# Patient Record
Sex: MALE | Race: WHITE
[De-identification: names, ages, dates, MRNs, and addresses within clinical notes are randomized per-mention and may not be internally consistent; named-entity substitution may affect disease eponyms.]

---

## 2018-09-17 NOTE — NUR
ASSUMED CARE OF PT THIS AM AROUND 0715- CARDIAC MONITOR IN PLACE AS ORDERED,
TRACING SR- UPON ASSESSMENT PT NOTED TO BE RESTING IN BED, WATCHING TV- PT A&O
X4- CONTINENT OF BOWEL AND BLADDER- UP AD-DWAIN IN ROOM, STEADY GAIT NOTED-
LCTA, RESP EVEN AND UN-LABORED AT REST- DYSPNEA NOTED ON EXERTION- VSS, O2 SAT
95% ON RA- ABDOMEN OBESE/ROUND/NON-TENDER, BS X 4 QUADS- PT REPORTS LAST BM
9/16/18- +1 PEDAL EDEMA NOTED- IV NOTED TO RUE INTACT AND SL- PT JANETH NPO
FOR PLANNED DUBUTAMINE STRESS ECHO TEST- D/C PENDING RESULTS- PT DENIES ANY
C/O PAIN/DISCOMFORT AT THIS TIME- CALL LIGHT AND PERSONAL BELONGINGS WITH IN
REACH- HOURLY ROUNDS IN PLACE R/T SAFETY/NEEDS- ALL NEEDS MET AT THIS
TIME-WCTM

## 2018-09-17 NOTE — EXE
[place] [address]
Phone:  [phone]                     STRESS ECHOCARDIOGRAM         
_______________________________________________________________________________
 
Name:         JAKE CASTELAN              Room:          [address]#:    [id number]     Account #:     [id number]  
Admission:    09/16/18    Attend Phys:   Uziel Blanton
Discharge:                Date of Birth: [birth year]  
Date of Service: 09/17/18 1702  Report #:      [id number]
        [id number]
_______________________________________________________________________________
THIS REPORT FOR:  //name//                      
 
 
--------------- APPROVED REPORT --------------
 
 
Study performed:  09/17/2018 15:51:36
 
Exam:  Dobutamine Stress Echo
Indication: Chest pain
Patient Location: In-Patient
Stress Nurse: Patrica Workman RN
Room #: [facility]
Supervising Physician: Todd Watters MD
 
Ht: 5 ft 10 in     
HR: 78 bpm       BP: 146/87 mmHg 
 
Medical History
Cardiac Risk Factors: HTN, Hyperlipidemia, FHX of CAD
 
Procedure
The patient underwent a Pharmacological Stress Test using Dobutamine. 
Blood pressure, heart rate, and EKG were monitored.
An Echocardiogram was performed by technician in four stages in quad 
fashion.  At peak stress, four selected images were obtained and 
placed side by side with resting images for comparison.
 
Echo Enhancing Agent
Indication: Endocardial border delineation
Agent(s) / Amount(s) Used: Optison 10 cc
 
Stress Test Details
Stress Test:  Pharmacological Stress Test using 
Dobutamine.      
HR           
Resting HR:            78 bpm   Max Heart Rate (APMHR): 165 bpm  
Max HR Achieved:  141 bpm   Target HR (85% APMHR): 140 bpm  
% of APMHR:         85         
Recovery HR:            94 bpm        
HR response to stress: Normal HR response to stress      
 
BP           
Resting BP:  146/87 mmHg        
Max BP:       156/86 mmHg        
Recovery BP:       110/70 mmHg        
 
 
[place] [address]
Phone:  [phone]                     STRESS ECHOCARDIOGRAM         
_______________________________________________________________________________
 
Name:         JAKE CASTELAN              Room:          [address]#:    [id number]     Account #:     [id number]  
Admission:    09/16/18    Attend Phys:   Uziel Blanton
Discharge:                Date of Birth: [birth year]  
Date of Service: 09/17/18 1702  Report #:      [id number]
        [id number]
_______________________________________________________________________________
 
ECG           
Resting ECG:  Sinus Rhythm, nonspecific ST-T 
abnormalities      
Stress ECG:     Sinus Rhythm, nonspecific ST-T abnormalities      
ST Change: Upsloping ST depression       
Maximum ST Deviation: 0.5 mm        
Arrhythmia:    None         
Recovery ECG: Sinus Rhythm, nonspecific ST-T 
abnormalities      
Recovery ST Change: Horizontal ST depression      
Recovery ST Deviation: 0.5 mm        
Recovery Arrhythmia: None        
 
Pre-Stress Echo
technically difficult to assess wall motion 
 
Post-Stress Echo
technically difficult to asses wall motion 
changes
 
Conclusion
Clinical Response:  Non-ischemic
Stress ECG Response:  Indeterminant
Stress Echo Images:  Indeterminant
images were suboptimal to assess for ischemic changes
 
Other Information
Study Quality: Technically Limited
Technically limited study due to body 
habitus.
 
&lt;Conclusion&gt;
images were suboptimal to assess for ischemic changes
 
 
 
 
 
 
 
 
 
 
  <ELECTRONICALLY SIGNED>
                                           By: Todd Watters MD, FACC      
  09/17/18 1702
D: 09/17/18 1702   _____________________________________
T: 09/17/18 1702   Todd Watters MD, FACC        /INF

## 2018-09-17 NOTE — NUR
RECEIVED REPORT AND ASSUMED CARE AT 2250. PT TRANSPORTED FROM ED TO ROOM 202.
VSS, CARDIAC MONITORING IN PLACE. ASSESSMENT COMPLETED AS CHARTED. PT DENIES
ANY CURRENT PAIN. PT ORIENTATED TO ROOM, FALL POLICY, CALL LIGHT. PT WEARING
HOME CPAP AT [facility]. ALL QUESTIONS AND CONCERNS ADDRESSED AT THIS TIME. BED
LOCKED IN LOWEST POSITION, CALL LIGHT WITHIN REACH. HOURLY ROUNDING COMPLETED
AND ALL NEEDS MET. WILL CONTINUE TO MONITOR

## 2018-09-17 NOTE — NUR
Pt is A&O. Resides at home. Independent with ADLs, continues to work outside
of the home. Pt has a home cpap. No hx of HH or SNF. Goal is to return home at
dc. Possible dc today, pendin echo. Following.

## 2018-09-17 NOTE — EKG
[place] [address]
Phone:  [phone]                     ELECTROCARDIOGRAM REPORT      
_______________________________________________________________________________
 
Name:       JAKE CASTELAN               Room:           [address]#:  [id number]      Account #:      [id number]  
Admission:  18     Attend Phys:    LILLIANA Chappell
Discharge:               Date of Birth:  [birth year]  
         Report #: [id number]
    [id number]
_______________________________________________________________________________
THIS REPORT FOR:  //name//                      
 
                         [facility] ED
                                       
Test Date:    2018               Test Time:    20:06:58
Pat Name:     JAKE CASTELAN           Department:   
Patient ID:   [id number]            Room:         [facility]
Gender:       M                        Technician:   DEION
:          1965               Requested By: Tito Núñez
Order Number: [id number] MD:   Todd Watters
                                 Measurements
Intervals                              Axis          
Rate:         91                       P:            -25
MS:           162                      QRS:          82
QRSD:         91                       T:            1
QT:           348                                    
QTc:          429                                    
                           Interpretive Statements
Sinus rhythm
nonspecific st changes
Low voltage, precordial leads
Compared to ECG 2011 18:26:28
Low QRS voltage now present
 
 
Electronically Signed On 2018 13:04:45 CDT by Todd Watters
[email or web address]
 
 
 
 
 
 
 
 
 
 
 
 
 
 
 
 
  <ELECTRONICALLY SIGNED>
                                           By: Todd Watters MD, FAC      
  18     1304
D: 18   _____________________________________
T: 18   Todd Watters MD, [facility]        /EPI

## 2018-09-17 NOTE — NUR
PT CURRENTLY RESTING IN BED SIDE RECLINER- CARDIAC MONITOR IN PLACE AS
ORDERED, TRACING SR- STRESS DUBUTIME ECHO COMPLETED THIS SHIFT- RESULTS NOTED
TO STATE THAT IMAGES WERE SUBOPTIMAL TO ASSESS FOR ISCHEMIC CHANGES- RESULTS
REPORTED TO - WITH ORDERS RECIEVIED FOR PT TO STAY ANOTHER NIGHT AND
CARDIOLOGY TO BE CONCULTED FOR FURTHER EVALUATION- PT HAS BEEN UPDATED ON PLAN
AND OKAY TO STAY AT THIS POINT- DENIES ANY C/O PAIN/DISCOMFORT AT THIS TIME-
ALL NEEDS MET AT THIS TIME- ABLE TO MAKE NEEDS KNOWN- ALL NEEDS MET AT THIS
TIME-WCTM

## 2018-09-18 NOTE — NUR
ASSUMED CARE OF PT AROUND 0730 THIS AM. REFER TO ASSESSMENT. PT HAS NO C/O
PAIN THIS AM. DC HOME ORDERS OBTAINED. PT GIVEN DISCHARGE INSTRUCTIONS AND
VERBALIZES UNDERSTANDING. PT TO F/U WITH PCP AND CARDIOLOGY. PT WAITING FOR
RIDE AT THIS TIME. PT WILL DC PER W/C WITH NURSING STAFF. NO OTHER CONCERNS AT
THIS TIME. CLWR. WCTM.

## 2018-09-18 NOTE — NUR
BEGAN CARE OF PT AT APPROXIMATELY 0200 TODAY. PT IS ABLE TO COMMUNICATE HIS
NEEDS TO STAFF EFFECTIVELY. HE HAS DENIED THE NEED FOR PAIN MEDICATION UP TO
THIS TIME. PT WEARING OWN CPAP AT NIGHT WHILE SLEEPING UP TO THIS TIME.
POSSIBLE DISCHARGE TODAY.

## 2018-09-18 NOTE — NUR
RECEIVED REPORT AND ASSUMED CARE AT 1900. VSS. CARDIAC MONITORING IN PLACE. PT
DENIES ANY COMPLAINTS OF PAIN. ASSESSMENT COMPLETED AS CHARTED. DISCUSSED PLAN
OF CARE WITH PT, VERBALIZED UNDERSTANDING. PT TO BE NPO AFTER MIDNIGHT FOR
CARDIOLOGY CONSULT. MEDICATION ADMIN PER EMAR. PT UP AD DWAIN IN ROOM, ON RA.
BED LOCKED IN LOWEST POSITION, CALL LIGHT WITHIN REACH. WILL CONTINUE TO
MONITOR FOR REMAINDER OF THE SHIFT

## 2019-11-04 ENCOUNTER — HOSPITAL ENCOUNTER (OUTPATIENT)
Dept: HOSPITAL 96 - M.CRD | Age: 54
End: 2019-11-04
Attending: INTERNAL MEDICINE
Payer: COMMERCIAL

## 2019-11-04 DIAGNOSIS — I10: Primary | ICD-10-CM

## 2019-11-04 DIAGNOSIS — R06.00: ICD-10-CM

## 2019-11-04 NOTE — 2DMMODE
[place] [address]
Phone:  [phone] 2 D/M-MODE ECHOCARDIOGRAM     
_______________________________________________________________________________
 
Name:         JAKE CASTELAN        Room:                     [facility]
M.R.#:    [id number]     Account #:     [id number]  
Admission:    11/04/19    Attend Phys:   Edmund Barfield,
Discharge:                Date of Birth: [birth year]  
Date of Service: 11/04/19 1536  Report #:      [id number]
        [id number]
_______________________________________________________________________________
THIS REPORT FOR:  //name//                      
 
 
--------------- APPROVED REPORT --------------
 
 
Study performed:  11/04/2019 12:56:22
 
EXAM: Comprehensive 2D, Doppler, and color-flow 
Echocardiogram 
Patient Location: Out-Patient   
 
      BSA:         2.94
HR: 69 bpm BP:          140/80 mmHg 
 
Other Information 
Study Quality: Technically Limited
Technically limited study due to  body habitus.
 
Indications
Dyspnea 
 
Echo Enhancing Agent
Indication: Endocardial border delineation
Agent(s) / Amount(s) Used: Optison 10 cc
 
2D Dimensions
IVSd:  14.72 (7-11mm) LVOT Diam:  19.98 (18-24mm) 
LVDd:  44.69 mm  
PWd:  14.20 (7-11mm) Ascending Ao:  31.09 (22-36mm)
LVDs:  31.79 (25-40mm) 
Aortic Root:  30.89 mm 
 
Pulmonary Valve
PV Peak Kemal.:  1.16 m/s PV Peak Gr.:  5.36 mmHg
 
Tricuspid Valve
TR Peak Gr.:  19.79 mmHg 
 
Left Ventricle
The left ventricle is normal size. There is normal LV segmental wall 
motion. Borderline concentric left ventricular hypertrophy. Left 
ventricular systolic function is normal. The left ventricular 
ejection fraction is within the normal range. LVEF is 60-65%.
 
Right Ventricle
 
 
[place] [address]
Phone:  [phone]                     2 D/M-MODE ECHOCARDIOGRAM     
_______________________________________________________________________________
 
Name:         FLIPJAKE AJAY        Room:                     [facility]
M.R.#:    [id number]     Account #:     [id number]  
Admission:    11/04/19    Attend Phys:   Edmund Barfield,
Discharge:                Date of Birth: [birth year]  
Date of Service: 11/04/19 1536  Report #:      [id number]
        [id number]
_______________________________________________________________________________
The right ventricle is normal size.
 
Atria
The left atrium size is normal. The right atrium size is 
normal.
 
Aortic Valve
The aortic valve is normal in structure.
 
Mitral Valve
The mitral valve is normal in structure.
 
Great Vessels
The aortic root is normal in size.
 
<Conclusion>
The left ventricle is normal size.
Borderline concentric left ventricular hypertrophy.
Left ventricular systolic function is normal.
The left ventricular ejection fraction is within the normal 
range.
LVEF is 60-65%.
The right ventricle is normal size.
The left atrium size is normal.
The aortic valve is normal in structure.
The mitral valve is normal in structure.
There is normal LV segmental wall motion.
 
 
 
 
 
 
 
 
 
 
 
 
 
 
 
 
 
  <ELECTRONICALLY SIGNED>
                                           By: Toribio Sandoval MD, [facility]     
  11/04/19     1536
D: 11/04/19 1536   _____________________________________
T: 11/04/19 1536   Toribio Sandoval MD, [facility]       /INF

## 2020-08-09 ENCOUNTER — HOSPITAL ENCOUNTER (EMERGENCY)
Dept: HOSPITAL 96 - M.ERS | Age: 55
Discharge: HOME | End: 2020-08-09
Payer: COMMERCIAL

## 2020-08-09 VITALS — DIASTOLIC BLOOD PRESSURE: 44 MMHG | SYSTOLIC BLOOD PRESSURE: 137 MMHG

## 2020-08-09 VITALS — HEIGHT: 70 IN | BODY MASS INDEX: 45.1 KG/M2 | WEIGHT: 315 LBS

## 2020-08-09 DIAGNOSIS — I48.91: ICD-10-CM

## 2020-08-09 DIAGNOSIS — E66.01: ICD-10-CM

## 2020-08-09 DIAGNOSIS — I10: ICD-10-CM

## 2020-08-09 DIAGNOSIS — K58.9: ICD-10-CM

## 2020-08-09 DIAGNOSIS — K21.9: ICD-10-CM

## 2020-08-09 DIAGNOSIS — R07.89: Primary | ICD-10-CM

## 2020-08-09 DIAGNOSIS — E78.00: ICD-10-CM

## 2020-08-09 LAB
ABSOLUTE BASOPHILS: 0.1 THOU/UL (ref 0–0.2)
ABSOLUTE EOSINOPHILS: 0.1 THOU/UL (ref 0–0.7)
ABSOLUTE MONOCYTES: 0.6 THOU/UL (ref 0–1.2)
ALBUMIN SERPL-MCNC: 3.7 G/DL (ref 3.4–5)
ALP SERPL-CCNC: 96 U/L (ref 46–116)
ALT SERPL-CCNC: 62 U/L (ref 30–65)
ANION GAP SERPL CALC-SCNC: 5 MMOL/L (ref 7–16)
APTT BLD: 27 SECONDS (ref 25–31.3)
AST SERPL-CCNC: 42 U/L (ref 15–37)
BASOPHILS NFR BLD AUTO: 0.5 %
BILIRUB SERPL-MCNC: 0.5 MG/DL
BUN SERPL-MCNC: 15 MG/DL (ref 7–18)
CALCIUM SERPL-MCNC: 9.4 MG/DL (ref 8.5–10.1)
CHLORIDE SERPL-SCNC: 98 MMOL/L (ref 98–107)
CK-MB MASS: 0.6 NG/ML
CO2 SERPL-SCNC: 33 MMOL/L (ref 21–32)
CREAT SERPL-MCNC: 1.1 MG/DL (ref 0.6–1.3)
EOSINOPHIL NFR BLD: 1.1 %
GLUCOSE SERPL-MCNC: 135 MG/DL (ref 70–99)
GRANULOCYTES NFR BLD MANUAL: 73.6 %
HCT VFR BLD CALC: 42 % (ref 42–52)
HGB BLD-MCNC: 14.4 GM/DL (ref 14–18)
INR PPP: 1.1
LIPASE: 142 U/L (ref 73–393)
LYMPHOCYTES # BLD: 2.2 THOU/UL (ref 0.8–5.3)
LYMPHOCYTES NFR BLD AUTO: 19.4 %
MAGNESIUM SERPL-MCNC: 1.7 MG/DL (ref 1.8–2.4)
MCH RBC QN AUTO: 31.2 PG (ref 26–34)
MCHC RBC AUTO-ENTMCNC: 34.3 G/DL (ref 28–37)
MCV RBC: 90.9 FL (ref 80–100)
MONOCYTES NFR BLD: 5.4 %
MPV: 6.4 FL. (ref 7.2–11.1)
NEUTROPHILS # BLD: 8.5 THOU/UL (ref 1.6–8.1)
NT-PRO BRAIN NAT PEPTIDE: 54 PG/ML (ref ?–300)
NUCLEATED RBCS: 0 /100WBC
PLATELET COUNT*: 285 THOU/UL (ref 150–400)
POTASSIUM SERPL-SCNC: 4.4 MMOL/L (ref 3.5–5.1)
PROT SERPL-MCNC: 8.7 G/DL (ref 6.4–8.2)
PROTHROMBIN TIME: 10.9 SECONDS (ref 9.2–11.5)
RBC # BLD AUTO: 4.62 MIL/UL (ref 4.5–6)
RDW-CV: 14.4 % (ref 10.5–14.5)
SODIUM SERPL-SCNC: 136 MMOL/L (ref 136–145)
WBC # BLD AUTO: 11.5 THOU/UL (ref 4–11)

## 2020-08-10 NOTE — EKG
[place] [address]
Phone:  [phone]                     ELECTROCARDIOGRAM REPORT      
_______________________________________________________________________________
 
Name:         FLIPJAKE MOSS        Room:                     [facility]#:    [id number]     Account #:     [id number]  
Admission:    20    Attend Phys:                     
Discharge:    20    Date of Birth: [birth year]  
Date of Service: 20  Report #:      [id number]
        [id number]
_______________________________________________________________________________
THIS REPORT FOR:  //name//                      
 
                         [facility] ED
                                       
Test Date:    2020               Test Time:    20:20:54
Pat Name:     JAKE CASTELAN           Department:   
Patient ID:   [id number]            Room:          
Gender:                               Technician:   
:          1965               Requested By: Hang Jha
Order Number: [id number] MD:   Todd Watters
                                 Measurements
Intervals                              Axis          
Rate:         83                       P:            -17
ME:           150                      QRS:          89
QRSD:         106                      T:            23
QT:           380                                    
QTc:          447                                    
                           Interpretive Statements
Sinus rhythm
Low voltage, precordial leads
Baseline wander in lead(s) II,III,aVF
Compared to ECG 2018 20:06:58
ST (T wave) deviation no longer present
Electronically Signed On 8- 9:56:13 CDT by Todd Watters
[email or web address]
 
 
 
 
 
 
 
 
 
 
 
 
 
 
 
 
 
 
 
  <ELECTRONICALLY SIGNED>
                                           By: Todd Watters MD, [facility]      
  08/10/20     0956
D: 20   _____________________________________
T: 20   Todd Watters MD, [facility]        /EPI